# Patient Record
Sex: MALE | Race: WHITE | NOT HISPANIC OR LATINO | Employment: FULL TIME | URBAN - METROPOLITAN AREA
[De-identification: names, ages, dates, MRNs, and addresses within clinical notes are randomized per-mention and may not be internally consistent; named-entity substitution may affect disease eponyms.]

---

## 2017-05-10 ENCOUNTER — TELEPHONE (OUTPATIENT)
Dept: UROLOGY | Facility: CLINIC | Age: 32
End: 2017-05-10

## 2017-05-10 NOTE — TELEPHONE ENCOUNTER
Notified pt regarding semen specimen dropped of to clinic. 1 dead sperm seen in specimen per Nicky Gonzales NP. Pt instructed to have multiple ejaculations before dropping off 2 more samples and to continue alternate birth control methods until 2 samples cleared. Verbalized understanding

## 2017-08-07 ENCOUNTER — OFFICE VISIT (OUTPATIENT)
Dept: URGENT CARE | Facility: CLINIC | Age: 32
End: 2017-08-07
Payer: COMMERCIAL

## 2017-08-07 VITALS
SYSTOLIC BLOOD PRESSURE: 140 MMHG | TEMPERATURE: 98 F | HEIGHT: 69 IN | OXYGEN SATURATION: 99 % | WEIGHT: 205 LBS | BODY MASS INDEX: 30.36 KG/M2 | HEART RATE: 79 BPM | DIASTOLIC BLOOD PRESSURE: 87 MMHG | RESPIRATION RATE: 18 BRPM

## 2017-08-07 DIAGNOSIS — H92.03 OTALGIA OF BOTH EARS: Primary | ICD-10-CM

## 2017-08-07 PROCEDURE — 3008F BODY MASS INDEX DOCD: CPT | Mod: S$GLB,,, | Performed by: FAMILY MEDICINE

## 2017-08-07 PROCEDURE — 99214 OFFICE O/P EST MOD 30 MIN: CPT | Mod: S$GLB,,, | Performed by: FAMILY MEDICINE

## 2017-08-07 RX ORDER — METHYLPREDNISOLONE 4 MG/1
4 TABLET ORAL DAILY
Qty: 1 PACKAGE | Refills: 0 | Status: SHIPPED | OUTPATIENT
Start: 2017-08-07 | End: 2018-05-22

## 2017-08-07 RX ORDER — FLUTICASONE PROPIONATE 50 MCG
1 SPRAY, SUSPENSION (ML) NASAL DAILY
Qty: 1 BOTTLE | Refills: 2 | Status: SHIPPED | OUTPATIENT
Start: 2017-08-07 | End: 2018-05-22

## 2017-08-07 NOTE — PROGRESS NOTES
"Subjective:       Patient ID: Baltazar Hollins is a 31 y.o. male.    Vitals:  height is 5' 9" (1.753 m) and weight is 93 kg (205 lb). His temperature is 97.9 °F (36.6 °C). His blood pressure is 140/87 (abnormal) and his pulse is 79. His respiration is 18 and oxygen saturation is 99%.     Chief Complaint: Otalgia    Otalgia    There is pain in both ears. This is a new problem. The current episode started 1 to 4 weeks ago. The problem occurs constantly. The problem has been unchanged. There has been no fever. Associated symptoms include hearing loss. Pertinent negatives include no abdominal pain, coughing, headaches or sore throat.     Review of Systems   Constitution: Negative for chills, fever and malaise/fatigue.   HENT: Positive for congestion, ear pain and hearing loss. Negative for headaches, hoarse voice and sore throat.    Eyes: Negative for discharge and redness.   Cardiovascular: Negative for chest pain, dyspnea on exertion and leg swelling.   Respiratory: Negative for cough, shortness of breath, sputum production and wheezing.    Musculoskeletal: Negative for myalgias.   Gastrointestinal: Negative for abdominal pain and nausea.       Objective:      Physical Exam   Constitutional: He is oriented to person, place, and time. He appears well-developed and well-nourished.   HENT:   Head: Normocephalic and atraumatic.   Right Ear: External ear normal. Tympanic membrane is bulging. A middle ear effusion is present.   Left Ear: External ear normal. Tympanic membrane is bulging. A middle ear effusion is present.   Nose: Nose normal.   Mouth/Throat: Oropharynx is clear and moist.   Eyes: Conjunctivae, EOM and lids are normal. Pupils are equal, round, and reactive to light.   Neck: Trachea normal, full passive range of motion without pain and phonation normal. Neck supple.   Cardiovascular: Normal rate.    Pulmonary/Chest: Effort normal.   Musculoskeletal: Normal range of motion.   Neurological: He is alert and " oriented to person, place, and time.   Skin: Skin is warm, dry and intact.   Psychiatric: He has a normal mood and affect. His speech is normal and behavior is normal. Judgment and thought content normal. Cognition and memory are normal.   Nursing note and vitals reviewed.      Assessment:       1. Otalgia of both ears        Plan:         Otalgia of both ears    Other orders  -     methylPREDNISolone (MEDROL DOSEPACK) 4 mg tablet; Take 1 tablet (4 mg total) by mouth once daily. use as directed  Dispense: 1 Package; Refill: 0  -     fluticasone (FLONASE) 50 mcg/actuation nasal spray; 1 spray by Each Nare route once daily.  Dispense: 1 Bottle; Refill: 2

## 2017-09-05 DIAGNOSIS — K21.9 GASTROESOPHAGEAL REFLUX DISEASE, ESOPHAGITIS PRESENCE NOT SPECIFIED: Primary | ICD-10-CM

## 2017-09-05 DIAGNOSIS — K20.0 EOSINOPHILIC ESOPHAGITIS: ICD-10-CM

## 2017-09-05 RX ORDER — OMEPRAZOLE 40 MG/1
40 CAPSULE, DELAYED RELEASE ORAL DAILY
Qty: 30 CAPSULE | Refills: 6 | Status: SHIPPED | OUTPATIENT
Start: 2017-09-05 | End: 2018-09-13

## 2017-09-05 NOTE — TELEPHONE ENCOUNTER
Spoke to Bobby, let him know rx has been sent.  F/u scheduled for 9/22 with LASHANDA Hooper.  Pt verbalizes understanding.

## 2017-09-05 NOTE — TELEPHONE ENCOUNTER
Pt requesting refill for Prilosec sent to [facility]. Last written on 11/11/16 by Dr. Caceres with 6 refills.    Rx pended for provider approval.

## 2017-09-22 ENCOUNTER — OFFICE VISIT (OUTPATIENT)
Dept: GASTROENTEROLOGY | Facility: CLINIC | Age: 32
End: 2017-09-22
Payer: COMMERCIAL

## 2017-09-22 VITALS
DIASTOLIC BLOOD PRESSURE: 79 MMHG | SYSTOLIC BLOOD PRESSURE: 128 MMHG | HEART RATE: 64 BPM | BODY MASS INDEX: 32.24 KG/M2 | HEIGHT: 69 IN | WEIGHT: 217.63 LBS

## 2017-09-22 DIAGNOSIS — K20.0 EOSINOPHILIC ESOPHAGITIS: Primary | ICD-10-CM

## 2017-09-22 PROCEDURE — 99214 OFFICE O/P EST MOD 30 MIN: CPT | Mod: S$GLB,,, | Performed by: NURSE PRACTITIONER

## 2017-09-22 PROCEDURE — 99999 PR PBB SHADOW E&M-EST. PATIENT-LVL III: CPT | Mod: PBBFAC,,, | Performed by: NURSE PRACTITIONER

## 2017-09-22 PROCEDURE — 3008F BODY MASS INDEX DOCD: CPT | Mod: S$GLB,,, | Performed by: NURSE PRACTITIONER

## 2017-09-22 NOTE — PROGRESS NOTES
[facility] Note    Reason for Visit:  Gastroesophageal reflux    PCP: Primary Doctor No     Referring MD:     HPI:  This is a 32 y.o. male here for f/u evaluation of EOE and GERD.  Last GI visit in 10/2016. Subsequent EGD w/ EOE.  Currently reports dysphagia and pyrosis well controlled with once daily PPI.  Pyrosis now occurring 1-2 twice every two weeks and dysphagia may have occurred once since last visit.   S/s are also better if eating smaller portions diana at night.  Briefly ran out of PPI and took Tums with relief.  Onset-10 years ago dx @[facility]. Took Nexium with good relief at that time, but has been off of it for a few years outside of occasional  PRN use. Not necessarily getting worse, but has been persisting over the years.    Abdominal pain - denies  Bowel habits - normal, 1-2 formed BM/day  GI bleeding - denies hematochezia, hematemesis, melena, BRBPR, black/tarry stools, and coffee ground emesis    ROS:  Constitutional: No fevers, no chills, No unintentional weight loss, no fatigue,   ENT: No allergies  CV: No chest pain, no palpitations, no perif. edema, no sob on exertion  Pulm: No cough, No shortness of breath, no wheezes, no sputum  Ophtho: No vision changes  GI: see HPI; also no nausea, no vomiting, no change in appetite  Derm: No rash  Heme: No lymphadenopathy, No bruising  MSK: +arthritis in hands, no muscle pain, no muscle weakness  : No dysuria, No hematuria  Endo: No hot or cold intolerance  Neuro: No syncope, No seizure,     Medical History:  has a past medical history of GERD (gastroesophageal reflux disease).    Surgical History:  has a past surgical history that includes Hand surgery and [place] tooth extraction.    Family History: family history includes Diabetes in his paternal grandmother..     Social History:  reports that he has never smoked. He does not have any smokeless tobacco history on file. He reports that he drinks about 3.6 oz of alcohol per  "week . He reports that he does not use drugs.    No Known Allergies    Current Outpatient Prescriptions   Medication Sig    multivitamin capsule Take 1 capsule by mouth once daily.    omeprazole (PRILOSEC) 40 MG capsule Take 1 capsule (40 mg total) by mouth once daily.    fluticasone (FLONASE) 50 mcg/actuation nasal spray 1 spray by Each Nare route once daily.    methylPREDNISolone (MEDROL DOSEPACK) 4 mg tablet Take 1 tablet (4 mg total) by mouth once daily. use as directed     No current facility-administered medications for this visit.        Objective Findings:    Vital Signs:  /79   Pulse 64   Ht 5' 9" (1.753 m)   Wt 98.7 kg (217 lb 9.5 oz)   BMI 32.13 kg/m²   Body mass index is 32.13 kg/m².    Physical Exam:  General Appearance: Well appearing in no acute distress  Head:   Normocephalic, without obvious abnormality  Eyes:    No scleral icterus, EOMI  ENT: Neck supple, Lips, mucosa, and tongue normal; teeth and gums normal  Lungs: CTA bilaterally in anterior and posterior fields, no wheezes, no crackles.  Heart:  Regular rate and rhythm, S1, S2 normal, no murmurs heard  Abdomen: Soft, non tender, non distended with positive bowel sounds in all four quadrants. No hepatosplenomegaly, ascites, or mass  Extremities: 2+ radial pulses, no clubbing, cyanosis or edema  Skin: No rash to exposed areas  Neurologic: A&Ox4      Labs:  No results found for: WBC, HGB, HCT, PLT, CHOL, TRIG, HDL, LDLDIRECT, ALT, AST, NA, K, CL, CREATININE, BUN, CO2, TSH, PSA, INR, GLUF, HGBA1C, MICROALBUR      Endoscopy:    11/2016 EGD Dr. Madonna ZAMORA grade A esophagitis. Moderate Schatzki ring. Dilated up to 18 mm. small HH. bx-EOE  Per pt, EGD 8-10 years ago @ [facility]- esophagitis and dx w/ GERD  Colonoscopy- none  Assessment:  1. Eosinophilic esophagitis           Recommendations:  1.EOE- responsive to daily PPI. S/s well controlled. 6 food elimination diet discussed and handout provided.  Long term PPI use discussed. Pt made " aware of slightly increased risk of CAP, C diff infections and decreased mag, vit D and Vit B- 12 levels with long term PPI use. Yearly Mag, B 12 and Vit D levels recomended. Routine bone mineral densities per PCP recommended. Pt instructed to contact PCP for s/s of lung infection (fever, chills, CP, SOB, and/or productive cough) or GI infection (profuse, watery diarrhea with or without rectal bleeding, severe abd pain,and/or fever). Understanding verbalized pt may trial wean to lowest dose necessary to control s/s (handout provided).       F/u in 6 mos-1 year.      Order summary:         Thank you so much for allowing me to participate in the care of Baltazar Hollins    Brooke Dueñas, APRN,FNP-C

## 2017-09-22 NOTE — PATIENT INSTRUCTIONS
Six food elimination diet    Association with eosinophilic esophagitis:    1. Wheat  2. Milk  3. Soy  4. Eggs  5. Nuts  6. Seafood     Eliminate all of the above foods for 6 weeks and then reintroduce 1 food product back every 2 weeks. If one worsens swallowing or reflux, it needs to be completely eliminated.    PPI use:  If you abruptly stop taking a PPI (if you have been using it for 12 weeks or more), you will likely experience rebound acid reflux as your stomach acid pumps reactivate. Therefore, it is important to wean off of your PPI slowly and let your body readjust. Wean off in this manner:    1. Take your PPI every other day for 2 weeks. Alternately try half dose daily for a week, then every other day for a week.  2. Then take your PPI every 3rd day for 1 week.  3. Then take your PPI just once a week.  4. Try to stop altogether, and use as/if needed    If symptoms return, go back to the lowest dose necessary to control your symptoms (Good control means symptoms of reflux occurring less than twice per week).

## 2017-12-01 ENCOUNTER — OFFICE VISIT (OUTPATIENT)
Dept: URGENT CARE | Facility: CLINIC | Age: 32
End: 2017-12-01
Payer: COMMERCIAL

## 2017-12-01 VITALS
TEMPERATURE: 98 F | DIASTOLIC BLOOD PRESSURE: 80 MMHG | HEIGHT: 69 IN | WEIGHT: 210 LBS | SYSTOLIC BLOOD PRESSURE: 128 MMHG | BODY MASS INDEX: 31.1 KG/M2 | HEART RATE: 82 BPM | OXYGEN SATURATION: 97 %

## 2017-12-01 DIAGNOSIS — S61.217A LACERATION OF LEFT LITTLE FINGER WITHOUT FOREIGN BODY WITHOUT DAMAGE TO NAIL, INITIAL ENCOUNTER: Primary | ICD-10-CM

## 2017-12-01 PROCEDURE — 12001 RPR S/N/AX/GEN/TRNK 2.5CM/<: CPT | Mod: S$GLB,,, | Performed by: FAMILY MEDICINE

## 2017-12-01 PROCEDURE — 99214 OFFICE O/P EST MOD 30 MIN: CPT | Mod: 25,S$GLB,, | Performed by: FAMILY MEDICINE

## 2017-12-01 RX ORDER — CEPHALEXIN 500 MG/1
500 CAPSULE ORAL EVERY 12 HOURS
Qty: 20 CAPSULE | Refills: 0 | Status: SHIPPED | OUTPATIENT
Start: 2017-12-01 | End: 2017-12-11

## 2017-12-01 NOTE — PROGRESS NOTES
"Subjective:       Patient ID: Baltazar Hollins is a 32 y.o. male.    Vitals:  height is 5' 9" (1.753 m) and weight is 95.3 kg (210 lb). His oral temperature is 98.1 °F (36.7 °C). His blood pressure is 128/80 and his pulse is 82. His oxygen saturation is 97%.     Chief Complaint: Laceration    Patient has laceration on the left 5th digit from a chain saw about 3 hours ago.       Laceration    The incident occurred 3 to 6 hours ago. The laceration is located on the left hand. The laceration mechanism was a metal edge. The pain is at a severity of 0/10. The patient is experiencing no pain. He reports no foreign bodies present. His tetanus status is UTD.     Review of Systems   Constitution: Negative for weakness and malaise/fatigue.   HENT: Negative for nosebleeds.    Cardiovascular: Negative for chest pain and syncope.   Respiratory: Negative for shortness of breath.    Musculoskeletal: Negative for back pain, joint pain, joint swelling and neck pain.   Gastrointestinal: Negative for abdominal pain.   Genitourinary: Negative for hematuria.   Neurological: Negative for dizziness and numbness.       Objective:      Physical Exam   Constitutional: He is oriented to person, place, and time. He appears well-developed and well-nourished.   HENT:   Head: Normocephalic and atraumatic.   Eyes: EOM are normal. Pupils are equal, round, and reactive to light.   Neck: Normal range of motion. Neck supple.   Musculoskeletal: Normal range of motion.   Neurological: He is alert and oriented to person, place, and time.   Skin: Laceration noted.            Assessment:       1. Laceration of left little finger without foreign body without damage to nail, initial encounter        Plan:         Laceration of left little finger without foreign body without damage to nail, initial encounter  -     LACERATION REPAIR    Other orders  -     cephALEXin (KEFLEX) 500 MG capsule; Take 1 capsule (500 mg total) by mouth every 12 (twelve) hours.  " Dispense: 20 capsule; Refill: 0

## 2017-12-01 NOTE — PROCEDURES
Laceration Repair  Date/Time: 12/1/2017 5:08 PM  Performed by: MOSES NETTLES  Authorized by: MOSES NETTLES   Body area: upper extremity  Location details: left small finger  Foreign bodies: no foreign bodies  Tendon involvement: none  Nerve involvement: none  Vascular damage: no  Comments: 1 steristrip placed with a splint

## 2017-12-04 ENCOUNTER — TELEPHONE (OUTPATIENT)
Dept: URGENT CARE | Facility: CLINIC | Age: 32
End: 2017-12-04

## 2018-05-22 ENCOUNTER — TELEPHONE (OUTPATIENT)
Dept: ENDOSCOPY | Facility: HOSPITAL | Age: 33
End: 2018-05-22

## 2018-05-22 DIAGNOSIS — R13.19 ESOPHAGEAL DYSPHAGIA: Primary | ICD-10-CM

## 2018-05-22 NOTE — TELEPHONE ENCOUNTER
Patient's wife inquiring about repeat EGD for  because he is having trouble swallowing.  Please advise.  Thank you.  Cynthia

## 2018-07-20 ENCOUNTER — ANESTHESIA EVENT (OUTPATIENT)
Dept: ENDOSCOPY | Facility: HOSPITAL | Age: 33
End: 2018-07-20
Payer: COMMERCIAL

## 2018-07-20 ENCOUNTER — HOSPITAL ENCOUNTER (OUTPATIENT)
Facility: HOSPITAL | Age: 33
Discharge: HOME | End: 2018-07-20
Attending: INTERNAL MEDICINE | Admitting: INTERNAL MEDICINE
Payer: COMMERCIAL

## 2018-07-20 ENCOUNTER — ANESTHESIA (OUTPATIENT)
Dept: ENDOSCOPY | Facility: HOSPITAL | Age: 33
End: 2018-07-20
Payer: COMMERCIAL

## 2018-07-20 ENCOUNTER — SURGERY (OUTPATIENT)
Age: 33
End: 2018-07-20

## 2018-07-20 VITALS
RESPIRATION RATE: 18 BRPM | BODY MASS INDEX: 31.84 KG/M2 | SYSTOLIC BLOOD PRESSURE: 129 MMHG | DIASTOLIC BLOOD PRESSURE: 69 MMHG | OXYGEN SATURATION: 100 % | TEMPERATURE: 99 F | WEIGHT: 215 LBS | HEIGHT: 69 IN | HEART RATE: 64 BPM

## 2018-07-20 DIAGNOSIS — R13.14 DYSPHAGIA, PHARYNGOESOPHAGEAL PHASE: Primary | ICD-10-CM

## 2018-07-20 DIAGNOSIS — R13.19 ESOPHAGEAL DYSPHAGIA: ICD-10-CM

## 2018-07-20 PROCEDURE — 25000003 PHARM REV CODE 250: Performed by: INTERNAL MEDICINE

## 2018-07-20 PROCEDURE — 63600175 PHARM REV CODE 636 W HCPCS: Performed by: NURSE ANESTHETIST, CERTIFIED REGISTERED

## 2018-07-20 PROCEDURE — C1726 CATH, BAL DIL, NON-VASCULAR: HCPCS | Performed by: INTERNAL MEDICINE

## 2018-07-20 PROCEDURE — 43249 ESOPH EGD DILATION <30 MM: CPT | Mod: ,,, | Performed by: INTERNAL MEDICINE

## 2018-07-20 PROCEDURE — E9220 PRA ENDO ANESTHESIA: HCPCS | Mod: ,,, | Performed by: NURSE ANESTHETIST, CERTIFIED REGISTERED

## 2018-07-20 PROCEDURE — 43239 EGD BIOPSY SINGLE/MULTIPLE: CPT

## 2018-07-20 PROCEDURE — 88305 TISSUE EXAM BY PATHOLOGIST: CPT | Mod: 26,,, | Performed by: PATHOLOGY

## 2018-07-20 PROCEDURE — 37000009 HC ANESTHESIA EA ADD 15 MINS: Performed by: INTERNAL MEDICINE

## 2018-07-20 PROCEDURE — 37000008 HC ANESTHESIA 1ST 15 MINUTES: Performed by: INTERNAL MEDICINE

## 2018-07-20 PROCEDURE — 43249 ESOPH EGD DILATION <30 MM: CPT | Performed by: INTERNAL MEDICINE

## 2018-07-20 PROCEDURE — 88305 TISSUE EXAM BY PATHOLOGIST: CPT | Performed by: PATHOLOGY

## 2018-07-20 RX ORDER — SODIUM CHLORIDE 9 MG/ML
INJECTION, SOLUTION INTRAVENOUS CONTINUOUS
Status: DISCONTINUED | OUTPATIENT
Start: 2018-07-20 | End: 2018-07-20 | Stop reason: HOSPADM

## 2018-07-20 RX ORDER — SODIUM CHLORIDE 0.9 % (FLUSH) 0.9 %
3 SYRINGE (ML) INJECTION
Status: DISCONTINUED | OUTPATIENT
Start: 2018-07-20 | End: 2018-07-20 | Stop reason: HOSPADM

## 2018-07-20 RX ORDER — PROPOFOL 10 MG/ML
VIAL (ML) INTRAVENOUS CONTINUOUS PRN
Status: DISCONTINUED | OUTPATIENT
Start: 2018-07-20 | End: 2018-07-20

## 2018-07-20 RX ORDER — LIDOCAINE HCL/PF 100 MG/5ML
SYRINGE (ML) INTRAVENOUS
Status: DISCONTINUED | OUTPATIENT
Start: 2018-07-20 | End: 2018-07-20

## 2018-07-20 RX ORDER — PROPOFOL 10 MG/ML
VIAL (ML) INTRAVENOUS
Status: DISCONTINUED | OUTPATIENT
Start: 2018-07-20 | End: 2018-07-20

## 2018-07-20 RX ADMIN — LIDOCAINE HYDROCHLORIDE 50 MG: 20 INJECTION, SOLUTION INTRAVENOUS at 12:07

## 2018-07-20 RX ADMIN — SODIUM CHLORIDE: 0.9 INJECTION, SOLUTION INTRAVENOUS at 11:07

## 2018-07-20 RX ADMIN — PROPOFOL 200 MCG/KG/MIN: 10 INJECTION, EMULSION INTRAVENOUS at 12:07

## 2018-07-20 RX ADMIN — PROPOFOL 100 MG: 10 INJECTION, EMULSION INTRAVENOUS at 12:07

## 2018-07-20 NOTE — DISCHARGE INSTRUCTIONS
Courtesy of [facility] website: [email or web address]      Eosinophilic Esophagitis  What is eosinophilic esophagitis?  Eosinophilic esophagitis (EoE) is an inflammation of the esophagus (the tube connecting the mouth to the stomach) caused by a specific white blood cell - the eosinophil. Nearly three quarters of affected cases occur in white males; and the number of people affected is about 1 in 10,000 (but health officials think this number is underreported). This is a relatively newly recognized disease that has been increasingly diagnosed in adults and children over the past decade.    What are the symptoms of eosinophilic esophagitis?  Patients with eosinophilic esophagitis may experience symptoms such as heartburn, regurgitation, chest pain and difficulty swallowing. Adolescents and adults with eosinophilic esophagitis frequently complain of intermittent swallowing problems; infants and young children may develop feeding disorders leading to poor weight gain. In a small number of cases, eosinophilic esophagitis leads to the development of an extremely narrowed esophagus and occasionally food may get stuck in the esophagus and require emergency removal.    How is eosinophilic esophagitis diagnosed?  Currently, eosinophilic esophagitis is diagnosed by upper endoscopy and biopsy. The endoscopy sometimes reveals rings, white plaques (patches), or grooves in the esophagus. However, eosinophilic esophagitis may be present even if the esophagus looks normal. That is why biopsy samples are taken. Biopsy samples look for an overabundance of eosinophils in the esophageal tissue. Sometimes multiple biopsies may need to be taken.    How is eosinophilic esophagitis treated?  There are two main treatment approaches to eosinophilic esophagitis: steroid medications and dietary management.    Drug approaches. Steroids are the most commonly used medication  "for both the control of the inflammation and the direct suppression of the eosinophils. These medications can be taken orally (in pill form) or topically. Steroids may need to be taken long term, though their long-term use for eosinophilic esophagitis has not been well studied. What is known is that for some patients, continued swallowed use of steroids can result in Candida infections (yeast infections of the mouth and esophagus) as a side effect.    A drug class that is currently being investigated for future use is biologic agents. These drugs would specifically target the white blood cell itself, the eosinophil.    Dietary management. There is also some thought that food allergies may be a cause of eosinophilic esophagitis. However, which foods might be the cause has yet to be determined. The more common foods associated with foods allergies in general are milk, eggs, nuts, beef, wheat, fish, shellfish, corn and soy. In the case of eosinophilic esophagitis, a single food may be problematic in some people and many foods may be the cause in others.    With this in mind, several dietary approaches can be tried. Under a "targeted" approach, foods are eliminated from the diet one at a time as best indicated by allergy testing. Unfortunately though, typical allergy tests - such as skin prick tests or blood tests - are not usually effective in determining the problematic foods responsible for eosinophilic esophagitis. Therefore another type of elimination diet - the "common foods" elimination diet (as outlined in the paragraph above) is often tried.    A third approach, "an elemental diet," consists of removing all sources of protein from the diet. This is a very strict, tasteless, amino acid formula-based diet that may require a feeding tube hooked up directly to the stomach in order to obtain enough nourishment. In this diet, only amino acids (the building blocks of proteins) are supplied to patients. This is, " however, the most effective diet for people with eosinophilic esophagitis.    With any of these food trial diets, foods are slowly reintroduced in an attempt to discover which ones are causing the allergic reaction. Repeat biopsy and endoscopic examination are necessary to determine which foods are not problematic.    Other non-drug approaches. Another treatment that has been tried for some patients is esophageal dilatation. This is tried specifically in patients who get food stuck in their esophagus.    What is the outlook for patients with eosinophilic esophagitis?  At least based on what is known to date, eosinophilic esophagitis does not cause cancer of the esophagus and is not thought to limit life expectancy in any way.

## 2018-07-20 NOTE — ANESTHESIA PREPROCEDURE EVALUATION
07/20/2018  Baltazar Hollins is a 32 y.o., male     Patient Active Problem List   Diagnosis    Esophageal dysphagia         Anesthesia Evaluation    I have reviewed the Patient Summary Reports.    I have reviewed the Nursing Notes.   I have reviewed the Medications.     Review of Systems  Anesthesia Hx:  No problems with previous Anesthesia    Cardiovascular:  Cardiovascular Normal     Pulmonary:  Pulmonary Normal    Hepatic/GI:   GERD    Neurological:  Neurology Normal    Endocrine:  Endocrine Normal        Physical Exam  General:  Well nourished    Airway/Jaw/Neck:  Airway Findings: Mouth Opening: Normal Tongue: Normal  General Airway Assessment: Adult  Mallampati: II  TM Distance: Normal, at least 6 cm       Chest/Lungs:  Chest/Lungs Findings: Clear to auscultation, Normal Respiratory Rate     Heart/Vascular:  Heart Findings: Rate: Normal  Rhythm: Regular Rhythm             Anesthesia Plan  Type of Anesthesia, risks & benefits discussed:  Anesthesia Type:  general, MAC  Patient's Preference:   Intra-op Monitoring Plan:   Intra-op Monitoring Plan Comments:   Post Op Pain Control Plan:   Post Op Pain Control Plan Comments:   Induction:   IV  Beta Blocker:  Patient is not currently on a Beta-Blocker (No further documentation required).       Informed Consent: Patient understands risks and agrees with Anesthesia plan.  Questions answered. Anesthesia consent signed with patient.  ASA Score: 2     Day of Surgery Review of History & Physical:            Ready For Surgery From Anesthesia Perspective.

## 2018-07-20 NOTE — [employer] PATIENT INSTRUCTIONS
Discharge Summary/Instructions after an Endoscopic Procedure  Patient Name: Baltazar Hollins  Patient MRN: [id number]  Patient YOB: 1985 Friday, July 20, 2018  Lester Yoder MD  RESTRICTIONS:  During your procedure today, you received medications for sedation.  These   medications may affect your judgment, balance and coordination.  Therefore,   for 24 hours, you have the following restrictions:   - DO NOT drive a car, operate machinery, make legal/financial decisions,   sign important papers or drink alcohol.    ACTIVITY:  Today: no heavy lifting, straining or running due to procedural   sedation/anesthesia.  The following day: return to full activity including work.  DIET:  Eat and drink normally unless instructed otherwise.     TREATMENT FOR COMMON SIDE EFFECTS:  - Mild abdominal pain, nausea, belching, bloating or excessive gas:  rest,   eat lightly and use a heating pad.  - Sore Throat: treat with throat lozenges and/or gargle with warm salt   water.  - Because air was used during the procedure, expelling large amounts of air   from your rectum or belching is normal.  - If a bowel prep was taken, you may not have a bowel movement for 1-3 days.    This is normal.  SYMPTOMS TO WATCH FOR AND REPORT TO YOUR PHYSICIAN:  1. Abdominal pain or bloating, other than gas cramps.  2. Chest pain.  3. Back pain.  4. Signs of infection such as: chills or fever occurring within 24 hours   after the procedure.  5. Rectal bleeding, which would show as bright red, maroon, or black stools.   (A tablespoon of blood from the rectum is not serious, especially if   hemorrhoids are present.)  6. Vomiting.  7. Weakness or dizziness.  GO DIRECTLY TO THE NEAREST EMERGENCY ROOM IF YOU HAVE ANY OF THE FOLLOWING:      Difficulty breathing              Chills and/or fever over 101 F   Persistent vomiting and/or vomiting blood   Severe abdominal pain   Severe chest pain   Black, tarry stools   Bleeding- more than one  tablespoon   Any other symptom or condition that you feel may need urgent attention  Your doctor recommends these additional instructions:  If any biopsies were taken, your doctors clinic will contact you in 1 to 2   weeks with any results.  - Patient has a contact number available for emergencies.  The signs and   symptoms of potential delayed complications were discussed with the   patient.  Return to normal activities tomorrow.  Written discharge   instructions were provided to the patient.   - Discharge patient to home.   - Await pathology results.   - The findings and recommendations were discussed with the designated   responsible adult.   - Return to GI clinic at appointment to be scheduled.  For questions, problems or results please call your physician - Lester Yoder MD at Work:  [phone].  [facility], EMERGENCY ROOM PHONE NUMBER: [phone]  IF A COMPLICATION OR EMERGENCY SITUATION ARISES AND YOU ARE UNABLE TO REACH   YOUR PHYSICIAN - GO DIRECTLY TO THE EMERGENCY ROOM.  Lester Yoder MD  7/20/2018 12:21:48 PM  This report has been verified and signed electronically.  [employer]

## 2018-07-20 NOTE — H&P
Short Stay Endoscopy History and Physical    PCP - Primary Doctor No     Procedure - EGD  ASA - per anesthesia  Mallampati - per anesthesia  History of Anesthesia problems - no  Family history Anesthesia problems -  no   Plan of anesthesia - General    HPI:  This is a 32 y.o. male here for evaluation of :       Dysphagia, gerd    ROS:  Constitutional: No fevers, chills, No weight loss  CV: No chest pain  Pulm: No cough, No shortness of breath  Ophtho: No vision changes  GI: see HPI  Derm: No rash    Medical History:  has a past medical history of GERD (gastroesophageal reflux disease).    Surgical History:  has a past surgical history that includes Hand surgery; [place] tooth extraction; and Vasectomy.    Family History: family history includes Diabetes in his paternal grandmother.. Otherwise no colon cancer, inflammatory bowel disease, or GI malignancies.    Social History:  reports that he has never smoked. He has never used smokeless tobacco. He reports that he drinks about 3.6 oz of alcohol per week . He reports that he does not use drugs.    Review of patient's allergies indicates:  No Known Allergies    Medications:   Prescriptions Prior to Admission   Medication Sig Dispense Refill Last Dose    omeprazole (PRILOSEC) 40 MG capsule Take 1 capsule (40 mg total) by mouth once daily. 30 capsule 6 Past Week at Unknown time    multivitamin capsule Take 1 capsule by mouth once daily.   More than a month at Unknown time       Physical Exam:    Vital Signs: see nursing notes    General Appearance: Well appearing in no acute distress  Eyes:    No scleral icterus  ENT: Neck supple, Lips, mucosa, and tongue normal; teeth and gums normal  Lungs: CTA anteriorly  Heart:  Regular rate, S1, S2 normal, no murmurs heard.  Abdomen: Soft, non tender, non distended with normal bowel sounds. No hepatosplenomegaly, ascites, or mass.  Extremities: No edema  Skin: No rash    Labs:  No results found for: WBC, HGB, HCT, PLT, CHOL,  TRIG, HDL, LDLDIRECT, ALT, AST, NA, K, CL, CREATININE, BUN, CO2, TSH, PSA, INR, GLUF, HGBA1C, MICROALBUR    I have explained the risks and benefits of endoscopy procedures to the patient including but not limited to bleeding, perforation, infection, and death.      Lester Yoder MD

## 2018-07-20 NOTE — ANESTHESIA POSTPROCEDURE EVALUATION
"Anesthesia Post Evaluation    Patient: Baltazar Hollins    Procedure(s) Performed: Procedure(s) (LRB):  ESOPHAGOGASTRODUODENOSCOPY (EGD) (N/A)    Final Anesthesia Type: general  Patient location during evaluation: PACU  Patient participation: Yes- Able to Participate  Level of consciousness: awake and alert  Post-procedure vital signs: reviewed and stable  Pain management: adequate  Airway patency: patent  PONV status at discharge: No PONV  Anesthetic complications: no      Cardiovascular status: blood pressure returned to baseline  Respiratory status: unassisted  Hydration status: euvolemic  Follow-up not needed.        Visit Vitals  /69 (BP Location: Left arm, Patient Position: Lying)   Pulse 64   Temp 37 °C (98.6 °F) (Temporal)   Resp 18   Ht 5' 9" (1.753 m)   Wt 97.5 kg (215 lb)   SpO2 100%   BMI 31.75 kg/m²       Pain/Leela Score: Pain Assessment Performed: Yes (7/20/2018 12:49 PM)  Presence of Pain: denies (7/20/2018 12:49 PM)  Leela Score: 10 (7/20/2018 12:29 PM)      "

## 2018-07-20 NOTE — TRANSFER OF CARE
"Anesthesia Transfer of Care Note    Patient: Baltazar Hollins    Procedure(s) Performed: Procedure(s) (LRB):  ESOPHAGOGASTRODUODENOSCOPY (EGD) (N/A)    Patient location: GI    Anesthesia Type: general    Transport from OR: Transported from OR on room air with adequate spontaneous ventilation    Post pain: adequate analgesia    Post assessment: no apparent anesthetic complications    Post vital signs: stable    Level of consciousness: sedated    Nausea/Vomiting: no nausea/vomiting    Complications: none    Transfer of care protocol was followed      Last vitals:   Visit Vitals  /86   Pulse 69   Temp 36.8 °C (98.2 °F) (Temporal)   Resp 14   Ht 5' 9" (1.753 m)   Wt 97.5 kg (215 lb)   SpO2 99%   BMI 31.75 kg/m²     "

## 2018-07-27 ENCOUNTER — TELEPHONE (OUTPATIENT)
Dept: ENDOSCOPY | Facility: HOSPITAL | Age: 33
End: 2018-07-27

## 2018-07-28 ENCOUNTER — TELEPHONE (OUTPATIENT)
Dept: GASTROENTEROLOGY | Facility: CLINIC | Age: 33
End: 2018-07-28

## 2018-07-28 DIAGNOSIS — K20.0 EOSINOPHILIC ESOPHAGITIS: Primary | ICD-10-CM

## 2018-08-22 ENCOUNTER — OFFICE VISIT (OUTPATIENT)
Dept: ALLERGY | Facility: CLINIC | Age: 33
End: 2018-08-22
Payer: COMMERCIAL

## 2018-08-22 VITALS — HEIGHT: 69 IN | BODY MASS INDEX: 33.07 KG/M2 | OXYGEN SATURATION: 98 % | WEIGHT: 223.31 LBS | HEART RATE: 80 BPM

## 2018-08-22 DIAGNOSIS — K20.0 EOSINOPHILIC ESOPHAGITIS: Primary | ICD-10-CM

## 2018-08-22 PROCEDURE — 99999 PR PBB SHADOW E&M-EST. PATIENT-LVL III: CPT | Mod: PBBFAC,,, | Performed by: ALLERGY & IMMUNOLOGY

## 2018-08-22 PROCEDURE — 95004 PERQ TESTS W/ALRGNC XTRCS: CPT | Mod: S$GLB,,, | Performed by: ALLERGY & IMMUNOLOGY

## 2018-08-22 PROCEDURE — 99244 OFF/OP CNSLTJ NEW/EST MOD 40: CPT | Mod: 25,S$GLB,, | Performed by: ALLERGY & IMMUNOLOGY

## 2018-08-22 NOTE — LETTER
August 22, 2018      Lester Yoder MD  [address]  [facility] [address]           [facility]  [address] [facility] [address] [place] [address]  Phone: [phone]          Patient: Baltazar Hollins   MR Number: [id number]   YOB: 1985   Date of Visit: 8/22/2018       Dear Dr. Lester Yoder:    Thank you for referring Baltazar Hollins to me for evaluation. Attached you will find relevant portions of my assessment and plan of care.    If you have questions, please do not hesitate to call me. I look forward to following Baltazar Hollins along with you.    Sincerely,    Elisa Lacey MD    Enclosure  CC:  No Recipients    If you would like to receive this communication electronically, please contact [email or web address] or [phone] to request more information on [employer] access.    For providers and/or their staff who would like to refer a patient to [facility], please contact us through our one-stop-shop provider referral line, [facility] , at [phone].    If you feel you have received this communication in error or would no longer like to receive these types of communications, please e-mail [email or web address]

## 2018-08-22 NOTE — PROGRESS NOTES
Subjective:       Patient ID: Baltazar Hollins is a 32 y.o. male.    Chief Complaint:  Allergies (Food allergies)      33 yo man presents for consult from Dr Lester Yoder for EoE. He states for years he has issues with reflux. Feels heartburn, wakes with cough and often regurgitate acid. He would have periodic times where difficult to swallow. Recent had EGD with stretching and biopsies and had 7 eos/HPF distal esophagus and 23 eos/HPF mid esophagus. He is on PPI, omeprazole daily ad advised to come for food allergy test. He denies chronic rhinitis asthma or eczema. He has no known food, insect or latex allergy. He can not identify and triggers fr dysphagia. He does have H/o sinus issues and had deviated septum repair and balloon sinuplasty in Dec 2017 and has helped. He takes no antihistamine medications just PPI.          Environmental History: see history section for home environment  Review of Systems   Constitutional: Negative for activity change, appetite change, chills, fatigue, fever and unexpected weight change.   HENT: Positive for trouble swallowing. Negative for congestion, ear discharge, ear pain, facial swelling, hearing loss, mouth sores, nosebleeds, postnasal drip, rhinorrhea, sinus pressure, sneezing, sore throat, tinnitus and voice change.    Eyes: Negative for discharge, redness, itching and visual disturbance.   Respiratory: Positive for cough. Negative for chest tightness, shortness of breath and wheezing.    Cardiovascular: Negative for chest pain, palpitations and leg swelling.   Gastrointestinal: Negative for abdominal distention, abdominal pain, constipation, diarrhea, nausea and vomiting.   Genitourinary: Negative for difficulty urinating.   Musculoskeletal: Negative for arthralgias, back pain, joint swelling and myalgias.   Skin: Negative for color change, pallor and rash.   Neurological: Negative for dizziness, tremors, speech difficulty, weakness, light-headedness and headaches.    Hematological: Negative for adenopathy. Does not bruise/bleed easily.   Psychiatric/Behavioral: Negative for agitation, confusion, decreased concentration and sleep disturbance. The patient is not nervous/anxious.         Objective:      Physical Exam   Constitutional: He is oriented to person, place, and time. He appears well-developed and well-nourished. No distress.   HENT:   Head: Normocephalic and atraumatic.   Right Ear: Hearing, tympanic membrane, external ear and ear canal normal.   Left Ear: Hearing, tympanic membrane, external ear and ear canal normal.   Nose: No mucosal edema (pink turbinates), rhinorrhea, sinus tenderness or septal deviation. No epistaxis.   Mouth/Throat: Oropharynx is clear and moist and mucous membranes are normal. No uvula swelling.   Eyes: Conjunctivae are normal. Right eye exhibits no discharge. Left eye exhibits no discharge.   Neck: Normal range of motion. No thyromegaly present.   Cardiovascular: Normal rate, regular rhythm and normal heart sounds.   No murmur heard.  Pulmonary/Chest: Effort normal and breath sounds normal. No respiratory distress. He has no wheezes.   Abdominal: Soft. He exhibits no distension. There is no tenderness.   Musculoskeletal: Normal range of motion. He exhibits no edema.   Lymphadenopathy:     He has no cervical adenopathy.   Neurological: He is alert and oriented to person, place, and time. Coordination normal.   Skin: Skin is warm and dry. No rash noted. No erythema.   Psychiatric: He has a normal mood and affect. His behavior is normal. Judgment and thought content normal.   Nursing note and vitals reviewed.      Laboratory:   Percutaneous Skin Testing: prick skin test all foods #60, 8/22/18:   Assessment:       1. Eosinophilic esophagitis         Plan:       1. No evidence of any IgE mediated food allergy on skin test, advised EoE is hard to find trigger but most of time related to milk and wheat. Advised eliminating milk from diet first to see  if symptoms improve  2. Dr Yoder notified of completed consult via Epic

## 2018-09-13 ENCOUNTER — LAB VISIT (OUTPATIENT)
Dept: LAB | Facility: HOSPITAL | Age: 33
End: 2018-09-13
Payer: COMMERCIAL

## 2018-09-13 ENCOUNTER — OFFICE VISIT (OUTPATIENT)
Dept: GASTROENTEROLOGY | Facility: CLINIC | Age: 33
End: 2018-09-13
Payer: COMMERCIAL

## 2018-09-13 VITALS
SYSTOLIC BLOOD PRESSURE: 142 MMHG | HEART RATE: 54 BPM | BODY MASS INDEX: 33.37 KG/M2 | HEIGHT: 69 IN | DIASTOLIC BLOOD PRESSURE: 98 MMHG | WEIGHT: 225.31 LBS

## 2018-09-13 DIAGNOSIS — K21.00 GASTROESOPHAGEAL REFLUX DISEASE WITH ESOPHAGITIS: ICD-10-CM

## 2018-09-13 DIAGNOSIS — Z51.81 ENCOUNTER FOR MONITORING LONG-TERM PROTON PUMP INHIBITOR THERAPY: ICD-10-CM

## 2018-09-13 DIAGNOSIS — K20.0 EOSINOPHILIC ESOPHAGITIS: Primary | ICD-10-CM

## 2018-09-13 DIAGNOSIS — Z79.899 ENCOUNTER FOR MONITORING LONG-TERM PROTON PUMP INHIBITOR THERAPY: ICD-10-CM

## 2018-09-13 LAB
25(OH)D3+25(OH)D2 SERPL-MCNC: 26 NG/ML
MAGNESIUM SERPL-MCNC: 2.4 MG/DL
VIT B12 SERPL-MCNC: 486 PG/ML

## 2018-09-13 PROCEDURE — 36415 COLL VENOUS BLD VENIPUNCTURE: CPT

## 2018-09-13 PROCEDURE — 82306 VITAMIN D 25 HYDROXY: CPT

## 2018-09-13 PROCEDURE — 82607 VITAMIN B-12: CPT

## 2018-09-13 PROCEDURE — 99999 PR PBB SHADOW E&M-EST. PATIENT-LVL III: CPT | Mod: PBBFAC,,, | Performed by: NURSE PRACTITIONER

## 2018-09-13 PROCEDURE — 99214 OFFICE O/P EST MOD 30 MIN: CPT | Mod: S$GLB,,, | Performed by: NURSE PRACTITIONER

## 2018-09-13 PROCEDURE — 3008F BODY MASS INDEX DOCD: CPT | Mod: CPTII,S$GLB,, | Performed by: NURSE PRACTITIONER

## 2018-09-13 PROCEDURE — 83735 ASSAY OF MAGNESIUM: CPT

## 2018-09-13 RX ORDER — OMEPRAZOLE 40 MG/1
40 CAPSULE, DELAYED RELEASE ORAL
Qty: 180 CAPSULE | Refills: 3 | Status: SHIPPED | OUTPATIENT
Start: 2018-09-13 | End: 2019-04-09

## 2018-09-13 NOTE — PROGRESS NOTES
[facility] Note    Reason for Visit:  Gastroesophageal reflux    PCP: Primary Doctor No     Referring MD:     HPI:  This is a 33 y.o. male here for f/u evaluation of EOE and GERD.  Has been taking two tabs omeprazole OTC since running out of RX PPI. GERD  controlled with omeprazole 40 mg daily, but few days weekly will take addition 40 mg for breakthrough pyrosis.   No dysphagia.   Seen by allergist. No allergies  EGD in 7/2018 with EOE  Currently reports dysphagia and pyrosis well controlled with once daily PPI.   GERD hx:Onset-10 years ago dx @[facility]. Took Nexium with good relief at that time.    Abdominal pain - denies  Bowel habits - normal, 1-2 formed BM/day  GI bleeding - denies hematochezia, hematemesis, melena, BRBPR, black/tarry stools, and coffee ground emesis    ROS:  Constitutional: No fevers, no chills, No unintentional weight loss, no fatigue,   ENT: No allergies  CV: No chest pain, no palpitations, no perif. edema, no sob on exertion  Pulm: No cough, No shortness of breath, no wheezes, no sputum  Ophtho: No vision changes  GI: see HPI; also no nausea, no vomiting, no change in appetite  Derm: No rash  Heme: No lymphadenopathy, No bruising  MSK: +arthritis in hands, no muscle pain, no muscle weakness  : No dysuria, No hematuria  Endo: No hot or cold intolerance  Neuro: No syncope, No seizure,     Medical History:  has a past medical history of GERD (gastroesophageal reflux disease).    Surgical History:  has a past surgical history that includes Hand surgery; Ada tooth extraction; Vasectomy; ESOPHAGOGASTRODUODENOSCOPY (EGD) (N/A, 7/20/2018); and ESOPHAGOGASTRODUODENOSCOPY (EGD) (N/A, 11/11/2016).    Family History: family history includes Diabetes in his paternal grandmother..     Social History:  reports that  has never smoked. he has never used smokeless tobacco. He reports that he drinks about 3.6 oz of alcohol per week. He reports that he does not use  "drugs.    No Known Allergies    Current Outpatient Medications   Medication Sig    omeprazole (PRILOSEC) 40 MG capsule Take 1 capsule (40 mg total) by mouth 2 (two) times daily before meals.     No current facility-administered medications for this visit.        Objective Findings:    Vital Signs:  BP (!) 142/98   Pulse (!) 54   Ht 5' 9" (1.753 m)   Wt 102.2 kg (225 lb 5 oz)   BMI 33.27 kg/m²   Body mass index is 33.27 kg/m².    Physical Exam:  General Appearance: Well appearing in no acute distress  Head:   Normocephalic, without obvious abnormality  Eyes:    No scleral icterus, EOMI  ENT: Neck supple, Lips, mucosa, and tongue normal; teeth and gums normal  Lungs: CTA bilaterally in anterior and posterior fields, no wheezes, no crackles.  Heart:  Regular rate and rhythm, S1, S2 normal, no murmurs heard  Abdomen: Soft, non tender, non distended with positive bowel sounds in all four quadrants. No hepatosplenomegaly, ascites, or mass  Extremities: 2+ radial pulses, no clubbing, cyanosis or edema  Skin: No rash to exposed areas  Neurologic: A&Ox4      Labs:  No results found for: WBC, HGB, HCT, PLT, CHOL, TRIG, HDL, LDLDIRECT, ALT, AST, NA, K, CL, CREATININE, BUN, CO2, TSH, PSA, INR, GLUF, HGBA1C, MICROALBUR      Endoscopy:    EGD 7/20/18: esophageal mucosal changes suspicious for EOE (). Dilated with balloon to 20 mm. Small HH. NL stomach. Nl duodenum.   11/2016 EGD Dr. Madonna ZAMORA grade A esophagitis. Moderate Schatzki ring. Dilated up to 18 mm. small HH. bx-EOE  Per pt, EGD 8-10 years ago @ [facility]- esophagitis and dx w/ GERD  Colonoscopy- none  Assessment:  1. Eosinophilic esophagitis    2. Gastroesophageal reflux disease with esophagitis    3. Encounter for monitoring long-term proton pump inhibitor therapy           Recommendations:  1.EOE- no dysphagia. increase PPI to BID for reflux. Will take 2 tabs OTC if insurance will not cover BID RX.  2. BID PPI as above.  3. Pt reminded of slightly increased risk " of CAP, C diff infections and decreased mag, vit D and Vit B- 12 levels with long term PPI use. Yearly Mag, B 12 and Vit D levels recomended. Routine bone mineral densities per PCP recommended. Pt instructed to contact PCP for s/s of lung infection (fever, chills, CP, SOB, and/or productive cough) or GI infection (profuse, watery diarrhea with or without rectal bleeding, severe abd pain,and/or fever). Vit D, mag and B 12 levels today    F/u 1 year or sooner prn.      Order summary:  Orders Placed This Encounter    Magnesium    Vitamin B12    Vitamin D    omeprazole (PRILOSEC) 40 MG capsule         Thank you so much for allowing me to participate in the care of Baltazar Hollins    Brooke Dueñas, APRN,FNP-C

## 2018-09-13 NOTE — PATIENT INSTRUCTIONS
*There is a slightly increased risk of pneumonia infection, C diff infections and decreased magnesium, vitamin D and Vitamin B- 12 levels with long term PPI (ie nexium, prilosec, prevacid, dexilant, protonix) use. Yearly Magnesium, B 12 and Vitamin D levels are recommended through your primary care provider (PCP). Routine bone mineral densities by your  PCP recommended. Contact your PCP for sings and symptoms of lung infection (fever, chills, CP, SOB, and/or productive cough) or Gastro infection (profuse, watery diarrhea with or without rectal bleeding, severe abd pain,and/or fever).

## 2018-09-25 ENCOUNTER — TELEPHONE (OUTPATIENT)
Dept: ENDOSCOPY | Facility: HOSPITAL | Age: 33
End: 2018-09-25

## 2018-09-25 NOTE — TELEPHONE ENCOUNTER
----- Message from Brooke Dueñas NP sent at 9/25/2018  4:25 PM CDT -----  Please inform pt: magnesium and B 12 levels are normal. Vitamin D level is slightly decreased at 26. Please have him take 2000 units of OTC vitamin D daily to supplement.    Thanks,  Irlanda, NP

## 2019-02-25 ENCOUNTER — OFFICE VISIT (OUTPATIENT)
Dept: INTERNAL MEDICINE | Facility: CLINIC | Age: 34
End: 2019-02-25
Payer: COMMERCIAL

## 2019-02-25 ENCOUNTER — IMMUNIZATION (OUTPATIENT)
Dept: PHARMACY | Facility: CLINIC | Age: 34
End: 2019-02-25
Payer: COMMERCIAL

## 2019-02-25 ENCOUNTER — CLINICAL SUPPORT (OUTPATIENT)
Dept: INTERNAL MEDICINE | Facility: CLINIC | Age: 34
End: 2019-02-25
Payer: COMMERCIAL

## 2019-02-25 ENCOUNTER — LAB VISIT (OUTPATIENT)
Dept: LAB | Facility: HOSPITAL | Age: 34
End: 2019-02-25
Attending: INTERNAL MEDICINE
Payer: COMMERCIAL

## 2019-02-25 VITALS
HEIGHT: 69 IN | HEART RATE: 78 BPM | BODY MASS INDEX: 33.62 KG/M2 | WEIGHT: 227 LBS | DIASTOLIC BLOOD PRESSURE: 66 MMHG | SYSTOLIC BLOOD PRESSURE: 128 MMHG | OXYGEN SATURATION: 97 %

## 2019-02-25 DIAGNOSIS — Z11.1 SCREENING FOR TUBERCULOSIS: ICD-10-CM

## 2019-02-25 DIAGNOSIS — Z00.00 HEALTH CARE MAINTENANCE: Primary | ICD-10-CM

## 2019-02-25 DIAGNOSIS — K21.9 GASTROESOPHAGEAL REFLUX DISEASE, ESOPHAGITIS PRESENCE NOT SPECIFIED: ICD-10-CM

## 2019-02-25 PROCEDURE — 99999 PR PBB SHADOW E&M-EST. PATIENT-LVL I: CPT | Mod: PBBFAC,,,

## 2019-02-25 PROCEDURE — 90715 TDAP VACCINE GREATER THAN OR EQUAL TO 7YO IM: ICD-10-PCS | Mod: S$GLB,,, | Performed by: INTERNAL MEDICINE

## 2019-02-25 PROCEDURE — 90471 TDAP VACCINE GREATER THAN OR EQUAL TO 7YO IM: ICD-10-PCS | Mod: S$GLB,,, | Performed by: INTERNAL MEDICINE

## 2019-02-25 PROCEDURE — 86480 TB TEST CELL IMMUN MEASURE: CPT

## 2019-02-25 PROCEDURE — 99385 PREV VISIT NEW AGE 18-39: CPT | Mod: S$GLB,,, | Performed by: INTERNAL MEDICINE

## 2019-02-25 PROCEDURE — 99999 PR PBB SHADOW E&M-EST. PATIENT-LVL III: CPT | Mod: PBBFAC,,, | Performed by: INTERNAL MEDICINE

## 2019-02-25 PROCEDURE — 99385 PR PREVENTIVE VISIT,NEW,18-39: ICD-10-PCS | Mod: S$GLB,,, | Performed by: INTERNAL MEDICINE

## 2019-02-25 PROCEDURE — 99999 PR PBB SHADOW E&M-EST. PATIENT-LVL III: ICD-10-PCS | Mod: PBBFAC,,, | Performed by: INTERNAL MEDICINE

## 2019-02-25 PROCEDURE — 90471 IMMUNIZATION ADMIN: CPT | Mod: S$GLB,,, | Performed by: INTERNAL MEDICINE

## 2019-02-25 PROCEDURE — 99999 PR PBB SHADOW E&M-EST. PATIENT-LVL I: ICD-10-PCS | Mod: PBBFAC,,,

## 2019-02-25 PROCEDURE — 90715 TDAP VACCINE 7 YRS/> IM: CPT | Mod: S$GLB,,, | Performed by: INTERNAL MEDICINE

## 2019-02-25 NOTE — PROGRESS NOTES
Subjective:       Patient ID: Baltazar Hollins is a 33 y.o. male.    Chief Complaint: Establish Care and Annual Exam    HPI   Baltazar Hollins is a 33 y.o. male here to establish care and have yearly preventative healthcare visit.     GERD  PPI bid  Doing well    Tired at end of the day. He does snore. Falls asleep watching tv but not other times. no falling asleep at work or driving. He had balloon sinuplasty which helped with snoring but persists.       The [place] Sleepiness Scale  The [place] Sleepiness Scale is widely used in the field of sleep medicine as a subjective measure of a  patient's sleepiness. The test is a list of eight situations in which you rate your tendency to become  sleepy on a scale of 0, no chance of dozing, to 3, high chance of dozing. When you finish the test, add  up the values of your responses. Your total score is based on a scale of 0 to 24. The scale estimates  whether you are experiencing excessive sleepiness that possibly requires medical attention.    How likely are you to doze off or fall asleep in the following situations? You should rate your chances  of dozing off, not just feeling tired. Even if you have not done some of these things recently try to  determine how they would have affected you. For each situation, decide whether or not you would  have:  ·  No chance of dozing =0  ·  Slight chance of dozing =1  ·  Moderate chance of dozing =2  ·  High chance of dozing =3    Write down the number corresponding to your choice in the right hand column. Total your score below.  Situation                                                                                   Chance of Dozing  Sitting and reading ·  0  Watching TV · 1  Sitting inactive in a public place (e.g., a theater or  a meeting) 0  As a passenger in a car for an hour without a  Break 0  Lying down to rest in the afternoon when  circumstances permit 1  Sitting and talking to someone · 0  Sitting quietly after a  "lunch without alcohol · 0  In a car, while stopped for a few minutes in traffic · 0  Total Score = _______2__________    Review of Systems   Constitutional: Negative for fever.   HENT: Negative.    Eyes: Negative.    Respiratory: Negative for shortness of breath.    Cardiovascular: Negative for chest pain and leg swelling.   Gastrointestinal: Negative for abdominal pain, diarrhea, nausea and vomiting.   Genitourinary: Negative.    Musculoskeletal: Negative for arthralgias.   Skin: Negative for rash.   Psychiatric/Behavioral: Negative.        Objective:   /66 (BP Location: Right arm, Patient Position: Sitting, BP Method: Large (Manual))   Pulse 78   Ht 5' 9" (1.753 m)   Wt 103 kg (227 lb)   SpO2 97%   BMI 33.52 kg/m²      Physical Exam   Constitutional: He is oriented to person, place, and time. He appears well-developed and well-nourished.   HENT:   Head: Normocephalic and atraumatic.   Eyes: Conjunctivae and EOM are normal. Pupils are equal, round, and reactive to light.   Neck: Neck supple. No thyromegaly present.   Cardiovascular: Normal rate, regular rhythm and normal heart sounds.   No murmur heard.  Pulmonary/Chest: Effort normal and breath sounds normal. No respiratory distress. He has no wheezes.   Abdominal: Soft. Bowel sounds are normal. He exhibits no distension. There is no tenderness.   Musculoskeletal: Normal range of motion.   Neurological: He is alert and oriented to person, place, and time.   Skin: Skin is warm and dry. No rash noted.   Psychiatric: He has a normal mood and affect. Judgment and thought content normal.   Vitals reviewed.      Assessment:       1. Health care maintenance    2. Gastroesophageal reflux disease, esophagitis presence not specified    3. Screening for tuberculosis        Plan:       Baltazar was seen today for establish care and annual exam.    Diagnoses and all orders for this visit:    Health care maintenance  -     CBC auto differential; Future  -     " Comprehensive metabolic panel; Future  -     Lipid panel; Future  -     TSH; Future    Gastroesophageal reflux disease, esophagitis presence not specified  Cont PPI BID  Followed by GI    Screening for tuberculosis  -     Quantiferon Gold TB; Future

## 2019-02-27 LAB
M TB IFN-G CD4+ BCKGRND COR BLD-ACNC: 0.01 IU/ML
MITOGEN IGNF BCKGRD COR BLD-ACNC: >10 IU/ML
MITOGEN IGNF BCKGRD COR BLD-ACNC: NEGATIVE [IU]/ML
NIL: 0.03 IU/ML
TB2 - NIL: 0.01 IU/ML

## 2019-04-02 ENCOUNTER — OFFICE VISIT (OUTPATIENT)
Dept: URGENT CARE | Facility: CLINIC | Age: 34
End: 2019-04-02
Payer: COMMERCIAL

## 2019-04-02 VITALS
DIASTOLIC BLOOD PRESSURE: 95 MMHG | HEART RATE: 60 BPM | WEIGHT: 215 LBS | BODY MASS INDEX: 31.84 KG/M2 | OXYGEN SATURATION: 99 % | HEIGHT: 69 IN | SYSTOLIC BLOOD PRESSURE: 134 MMHG | TEMPERATURE: 98 F

## 2019-04-02 DIAGNOSIS — L23.7 POISON IVY DERMATITIS: Primary | ICD-10-CM

## 2019-04-02 PROCEDURE — 96372 THER/PROPH/DIAG INJ SC/IM: CPT | Mod: S$GLB,,, | Performed by: FAMILY MEDICINE

## 2019-04-02 PROCEDURE — 99214 PR OFFICE/OUTPT VISIT, EST, LEVL IV, 30-39 MIN: ICD-10-PCS | Mod: 25,S$GLB,, | Performed by: PHYSICIAN ASSISTANT

## 2019-04-02 PROCEDURE — 3008F PR BODY MASS INDEX (BMI) DOCUMENTED: ICD-10-PCS | Mod: CPTII,S$GLB,, | Performed by: PHYSICIAN ASSISTANT

## 2019-04-02 PROCEDURE — 99214 OFFICE O/P EST MOD 30 MIN: CPT | Mod: 25,S$GLB,, | Performed by: PHYSICIAN ASSISTANT

## 2019-04-02 PROCEDURE — 3008F BODY MASS INDEX DOCD: CPT | Mod: CPTII,S$GLB,, | Performed by: PHYSICIAN ASSISTANT

## 2019-04-02 PROCEDURE — 96372 PR INJECTION,THERAP/PROPH/DIAG2ST, IM OR SUBCUT: ICD-10-PCS | Mod: S$GLB,,, | Performed by: FAMILY MEDICINE

## 2019-04-02 RX ORDER — BETAMETHASONE SODIUM PHOSPHATE AND BETAMETHASONE ACETATE 3; 3 MG/ML; MG/ML
12 INJECTION, SUSPENSION INTRA-ARTICULAR; INTRALESIONAL; INTRAMUSCULAR; SOFT TISSUE
Status: COMPLETED | OUTPATIENT
Start: 2019-04-02 | End: 2019-04-02

## 2019-04-02 RX ORDER — PREDNISONE 20 MG/1
TABLET ORAL
Qty: 18 TABLET | Refills: 0 | Status: SHIPPED | OUTPATIENT
Start: 2019-04-02

## 2019-04-02 RX ORDER — HYDROXYZINE PAMOATE 25 MG/1
25 CAPSULE ORAL 4 TIMES DAILY
Qty: 30 CAPSULE | Refills: 1 | Status: SHIPPED | OUTPATIENT
Start: 2019-04-02

## 2019-04-02 RX ADMIN — BETAMETHASONE SODIUM PHOSPHATE AND BETAMETHASONE ACETATE 12 MG: 3; 3 INJECTION, SUSPENSION INTRA-ARTICULAR; INTRALESIONAL; INTRAMUSCULAR; SOFT TISSUE at 08:04

## 2019-04-02 NOTE — PATIENT INSTRUCTIONS
Start Prednisone tomorrow    Allergic Reaction /Contact Dermatitis  If your condition worsens or fails to improve we recommend that you receive another evaluation at the ER immediately or contact your PCP to discuss your concerns or return here. You must understand that you've received an urgent care treatment only and that you may be released before all your medical problems are known or treated. You the patient will arrange for followup care as instructed.      Zyrtec, Claritin or Allegra should be used daily for the next 5-7 days to prevent or suppress the itching. You can take Benedryl 25 mg at bedtime as well.      Please take over the counter Pepcid or Zantac as directed for the next 24-72 hours as needed.     If you had a steroid shot/prednisone pills here in the clinic today and given a prescription of a steroid such as prednisone or a Medrol Dose Pack, please don't start the oral prescription until  till tomorrow.      If you develop additional symptoms such as tongue swelling or trouble breathing go immediately to the ER.      FOLLOW UP WITH YOUR DERMATOLOGIST AS WE DISCUSSED IF SYMPTOMS DON'T IMPROVE OVER THE NEXT 4-5 DAYS.     AVOID TRIGGERS!        If not allergic,take tylenol (acetominophen) for fever control, chills, or body aches every 4 hours. Do not exceed 4000 mg/ day.If not allergic, take Motrin (Ibuprofen) every 4 hours for fever, chills, pain or inflammation. Do not exceed 2400 mg/day. You can alternate taking tylenol and motrin.  If you were prescribed a narcotic medication, do not drive or operate heavy equipment or machinery while taking these medications.  You must understand that you've received an Urgent Care treatment only and that you may be released before all your medical problems are known or treated. You, the patient, will arrange for follow up care as instructed.  Follow up with your PCP or specialty clinic as directed in the next 1-2 weeks if not improved or as needed.  You can  call [phone] to schedule an appointment with the appropriate provider.  If your condition worsens we recommend that you receive another evaluation at the emergency room immediately or contact your primary medical clinics after hours call service to discuss your concerns.  Please return here or go to the Emergency Department for any concerns or worsening of condition.

## 2019-04-02 NOTE — PROGRESS NOTES
"Subjective:       Patient ID: Baltazar Hollins is a 33 y.o. male.    Vitals:  height is 5' 9" (1.753 m) and weight is 97.5 kg (215 lb). His oral temperature is 98.3 °F (36.8 °C). His blood pressure is 134/95 (abnormal) and his pulse is 60. His oxygen saturation is 99%.     Chief Complaint: Melody Rosales was working to replace a fence and cutting branches out of his way Saturday. Hours later he noticed a rash on his arms that has now spread.    Rash   This is a new problem. The current episode started in the past 7 days. The problem has been rapidly worsening since onset. The rash is diffuse. The rash is characterized by itchiness, redness and blistering. He was exposed to plant contact. Pertinent negatives include no cough, fever or sore throat. Past treatments include antihistamine and anti-itch cream. The treatment provided mild relief.       Constitution: Negative for chills and fever.   HENT: Negative for facial swelling and sore throat.    Neck: Negative for painful lymph nodes.   Eyes: Negative for eye itching and eyelid swelling.   Respiratory: Negative for cough.    Musculoskeletal: Negative for joint pain and joint swelling.   Skin: Positive for rash. Negative for color change, pale, wound, abrasion, laceration, lesion, skin thickening/induration, puncture wound, erythema, abscess, avulsion and hives.   Allergic/Immunologic: Negative for environmental allergies, immunocompromised state and hives.   Hematologic/Lymphatic: Negative for swollen lymph nodes.       Objective:      Physical Exam   Constitutional: He is oriented to person, place, and time. He appears well-developed and well-nourished.   HENT:   Head: Normocephalic and atraumatic. Head is without abrasion, without contusion and without laceration.   Right Ear: External ear normal.   Left Ear: External ear normal.   Nose: Nose normal.   Mouth/Throat: Oropharynx is clear and moist.   Eyes: Pupils are equal, round, and reactive to light. " Conjunctivae, EOM and lids are normal.   Neck: Trachea normal, full passive range of motion without pain and phonation normal. Neck supple.   Cardiovascular: Normal rate, regular rhythm and normal heart sounds.   Pulmonary/Chest: Effort normal and breath sounds normal. No stridor. No respiratory distress.   Musculoskeletal: Normal range of motion.   Neurological: He is alert and oriented to person, place, and time.   Skin: Skin is warm, dry and intact. Capillary refill takes less than 2 seconds. Rash noted. No abrasion, no bruising, no burn, no ecchymosis, no laceration and no lesion noted. Rash is maculopapular, vesicular and urticarial. No erythema.        Psychiatric: He has a normal mood and affect. His speech is normal and behavior is normal. Judgment and thought content normal. Cognition and memory are normal.   Nursing note and vitals reviewed.      Assessment:       1. Poison ivy dermatitis        Plan:         Poison ivy dermatitis  -     betamethasone acetate-betamethasone sodium phosphate injection 12 mg  -     predniSONE (DELTASONE) 20 MG tablet; Take 2 pills x 5 days, 1 pill x 5 days, 1/2 pill until empty.  Dispense: 18 tablet; Refill: 0  -     hydrOXYzine pamoate (VISTARIL) 25 MG Cap; Take 1 capsule (25 mg total) by mouth 4 (four) times daily.  Dispense: 30 capsule; Refill: 1      Patient Instructions   Start Prednisone tomorrow    Allergic Reaction /Contact Dermatitis  If your condition worsens or fails to improve we recommend that you receive another evaluation at the ER immediately or contact your PCP to discuss your concerns or return here. You must understand that you've received an urgent care treatment only and that you may be released before all your medical problems are known or treated. You the patient will arrange for followup care as instructed.      Zyrtec, Claritin or Allegra should be used daily for the next 5-7 days to prevent or suppress the itching. You can take Benedryl 25 mg at  bedtime as well.      Please take over the counter Pepcid or Zantac as directed for the next 24-72 hours as needed.     If you had a steroid shot/prednisone pills here in the clinic today and given a prescription of a steroid such as prednisone or a Medrol Dose Pack, please don't start the oral prescription until  till tomorrow.      If you develop additional symptoms such as tongue swelling or trouble breathing go immediately to the ER.      FOLLOW UP WITH YOUR DERMATOLOGIST AS WE DISCUSSED IF SYMPTOMS DON'T IMPROVE OVER THE NEXT 4-5 DAYS.     AVOID TRIGGERS!        If not allergic,take tylenol (acetominophen) for fever control, chills, or body aches every 4 hours. Do not exceed 4000 mg/ day.If not allergic, take Motrin (Ibuprofen) every 4 hours for fever, chills, pain or inflammation. Do not exceed 2400 mg/day. You can alternate taking tylenol and motrin.  If you were prescribed a narcotic medication, do not drive or operate heavy equipment or machinery while taking these medications.  You must understand that you've received an Urgent Care treatment only and that you may be released before all your medical problems are known or treated. You, the patient, will arrange for follow up care as instructed.  Follow up with your PCP or specialty clinic as directed in the next 1-2 weeks if not improved or as needed.  You can call [phone] to schedule an appointment with the appropriate provider.  If your condition worsens we recommend that you receive another evaluation at the emergency room immediately or contact your primary medical clinics after hours call service to discuss your concerns.  Please return here or go to the Emergency Department for any concerns or worsening of condition.

## 2019-04-05 ENCOUNTER — TELEPHONE (OUTPATIENT)
Dept: URGENT CARE | Facility: CLINIC | Age: 34
End: 2019-04-05

## 2019-04-09 ENCOUNTER — OFFICE VISIT (OUTPATIENT)
Dept: URGENT CARE | Facility: CLINIC | Age: 34
End: 2019-04-09
Payer: COMMERCIAL

## 2019-04-09 VITALS
RESPIRATION RATE: 18 BRPM | TEMPERATURE: 103 F | OXYGEN SATURATION: 100 % | WEIGHT: 215 LBS | SYSTOLIC BLOOD PRESSURE: 134 MMHG | HEART RATE: 112 BPM | BODY MASS INDEX: 31.84 KG/M2 | DIASTOLIC BLOOD PRESSURE: 85 MMHG | HEIGHT: 69 IN

## 2019-04-09 DIAGNOSIS — R50.9 FEVER, UNSPECIFIED FEVER CAUSE: ICD-10-CM

## 2019-04-09 DIAGNOSIS — R68.89 FLU-LIKE SYMPTOMS: Primary | ICD-10-CM

## 2019-04-09 LAB
CTP QC/QA: YES
FLUAV AG NPH QL: NEGATIVE
FLUBV AG NPH QL: NEGATIVE

## 2019-04-09 PROCEDURE — 99214 PR OFFICE/OUTPT VISIT, EST, LEVL IV, 30-39 MIN: ICD-10-PCS | Mod: S$GLB,,, | Performed by: PHYSICIAN ASSISTANT

## 2019-04-09 PROCEDURE — 99214 OFFICE O/P EST MOD 30 MIN: CPT | Mod: S$GLB,,, | Performed by: PHYSICIAN ASSISTANT

## 2019-04-09 PROCEDURE — 3008F PR BODY MASS INDEX (BMI) DOCUMENTED: ICD-10-PCS | Mod: CPTII,S$GLB,, | Performed by: PHYSICIAN ASSISTANT

## 2019-04-09 PROCEDURE — 87804 POCT INFLUENZA A/B: ICD-10-PCS | Mod: 59,QW,S$GLB, | Performed by: PHYSICIAN ASSISTANT

## 2019-04-09 PROCEDURE — 87804 INFLUENZA ASSAY W/OPTIC: CPT | Mod: QW,S$GLB,, | Performed by: PHYSICIAN ASSISTANT

## 2019-04-09 PROCEDURE — 3008F BODY MASS INDEX DOCD: CPT | Mod: CPTII,S$GLB,, | Performed by: PHYSICIAN ASSISTANT

## 2019-04-10 NOTE — PATIENT INSTRUCTIONS
INFLUENZA (Adult)          Influenza, also called 'the flu,' is a viral illness that affects the air passages of the lungs. It differs from the common cold. It is highly contagious. It may be spread through the air by coughing and sneezing or by direct contact (touching the sick person and then touching your own eyes, nose or mouth).    Illness starts 1-3 days after exposure and lasts for 1-2 weeks. Antibiotics are usually not needed unless a complication appears (ear or sinus infection or pneumonia).    Symptoms may be mild or severe and can include extreme tiredness (wanting to stay in bed all day), chills, fevers, muscle aching, soreness with eye movement, headache and a dry, hacking cough.    HOME CARE:  Avoid exposure to cigarette smoke (yours or others).  Tylenol or ibuprofen (Advil) will help fever, muscle aching and headache. To avoid risk of liver injury, aspirin should not be used in children and teenagers under 18 with this illness.  Nausea and loss of appetite are common. A light diet is recommended. Avoid dehydration by drinking 6-8 glasses of fluids per day (water, sport drinks like Gatorade, soft drinks without caffeine, juices, tea, soup, etc.). Extra fluids will also help loosen secretions in the nose and lungs.  Over-the-counter cold medicines will not shorten the duration of the illness but may be helpful for the following symptoms: cough (Robitussin DM); sore throat (Chloraseptic lozenges or spray); nasal and sinus congestion (Actifed or Sudafed). [NOTE: Do not use decongestants if you have high blood pressure.]  Stay home until your fever has been gone for at least 24 hours (without the use of fever-reducing medications such as ibuprofen).    FOLLOW UP with your doctor or as directed by our staff if you are not improving over the next week.    NOTE: If you are age 65 or older, or if you have chronic asthma or COPD, we recommend a pneumococcal vaccination every five years and a yearly influenza  vaccination (flu-shot) every autumn. Ask your doctor about this.    GET PROMPT MEDICAL ATTENTION if any of the following occur:  Cough with lots of colored sputum (mucus) or blood in your sputum  Chest pain, shortness of breath, wheezing or difficulty breathing  Severe headache, face, neck or ear pain  New rash  Fever over 101.5º F (38.6 C) for more than three days  Confusion, behavior change or seizure  Severe weakness or dizziness    Symptomatic treatment:    Alternate Tylenol and Ibuprofen every 3 hrs  salt water gargles to soothe throat  Honey/lemon water to soothe throat  Cold-eeze helps to reduce the duration of URI symptoms  Elderberry to reduce duration of URI symptoms  Cepachol helps to numb the discomfort in throat  Nasal saline spray reduces inflammation and dryness  Warm face compresses/hot showers as often as you can to open sinuses   Vicks vapor rub at night  Flonase OTC or Nasacort OTC  Simple foods like chicken noodle soup help hydrate  Delsym helps with coughing at night  Zyrtec/Claritin during the day and Benadryl at night may help if allergy component   Zantac will help if there is reflux from the post nasal drip  Rest as much as you can  Your symptoms will likely last 5-7 days, maybe longer depending on how it affects your body.  You are contagious 5-7, so minimize contact with others to reduce the spread to others. Dehydration is preventable but is one of the main reasons why you will feel so badly. Drink pedialyte, gatorade or propel. Stay hydrated.  Antibiotics are not needed unless a complication(such as Otitis Media, Bacterial sinus infection or pneumonia). Taking antibiotics for Flu/Cold is not supported by evidence-based medicine and can expose you to unnecessary side effects of the medication, such as anaphylaxis.   If you experience any:  Chest pain, shortness of breath, wheezing or difficulty breathing  Severe headache, face, neck or ear pain  New rash  Fever over 101.5º F (38.6 C) for  more than three days  Confusion, behavior change or seizure  Severe weakness or dizziness  Go to ER       If not allergic,take tylenol (acetominophen) for fever control, chills, or body aches every 4 hours. Do not exceed 4000 mg/ day.If not allergic, take Motrin (Ibuprofen) every 4 hours for fever, chills, pain or inflammation. Do not exceed 2400 mg/day. You can alternate taking tylenol and motrin.  If you were prescribed a narcotic medication, do not drive or operate heavy equipment or machinery while taking these medications.  You must understand that you've received an Urgent Care treatment only and that you may be released before all your medical problems are known or treated. You, the patient, will arrange for follow up care as instructed.  Follow up with your PCP or specialty clinic as directed in the next 1-2 weeks if not improved or as needed.  You can call [phone] to schedule an appointment with the appropriate provider.  If your condition worsens we recommend that you receive another evaluation at the emergency room immediately or contact your primary medical clinics after hours call service to discuss your concerns.  Please return here or go to the Emergency Department for any concerns or worsening of condition.

## 2019-04-10 NOTE — PROGRESS NOTES
"Subjective:       Patient ID: Baltazar Hollins is a 33 y.o. male.    Vitals:  height is 5' 9" (1.753 m) and weight is 97.5 kg (215 lb). His oral temperature is 103.3 °F (39.6 °C) (abnormal). His blood pressure is 134/85 and his pulse is 112 (abnormal). His respiration is 18 and oxygen saturation is 100%.     Chief Complaint: Fever    Pt started feeling achy around noon today and fever 103 before coming here. Pt took advil about 10 minutes ago. Pt states his son was running fever last two days also    Fever    This is a new problem. The current episode started today. The problem occurs constantly. The problem has been unchanged. The maximum temperature noted was 103 to 103.9 F. The temperature was taken using an oral thermometer. Pertinent negatives include no chest pain, congestion, coughing, diarrhea, headaches, nausea, rash, sore throat, urinary pain or vomiting. Treatments tried: advil. The treatment provided no relief.       Constitution: Positive for fever. Negative for chills and fatigue.   HENT: Negative for congestion and sore throat.    Neck: Negative for painful lymph nodes.   Cardiovascular: Negative for chest pain and leg swelling.   Eyes: Negative for double vision and blurred vision.   Respiratory: Negative for cough and shortness of breath.    Gastrointestinal: Negative for nausea, vomiting and diarrhea.   Genitourinary: Negative for dysuria, frequency and urgency.   Musculoskeletal: Positive for muscle ache. Negative for joint pain, joint swelling and muscle cramps.   Skin: Negative for color change, pale and rash.   Allergic/Immunologic: Negative for seasonal allergies.   Neurological: Negative for dizziness, history of vertigo, light-headedness, passing out and headaches.   Hematologic/Lymphatic: Negative for swollen lymph nodes, easy bruising/bleeding and history of blood clots. Does not bruise/bleed easily.   Psychiatric/Behavioral: Negative for nervous/anxious, sleep disturbance and " depression. The patient is not nervous/anxious.        Objective:      Physical Exam   Constitutional: He is oriented to person, place, and time. He appears well-developed and well-nourished. He is cooperative.  Non-toxic appearance. He appears ill. No distress.   HENT:   Head: Normocephalic and atraumatic.   Right Ear: Hearing, tympanic membrane, external ear and ear canal normal.   Left Ear: Hearing, tympanic membrane, external ear and ear canal normal.   Nose: Nose normal. No mucosal edema, rhinorrhea or nasal deformity. No epistaxis. Right sinus exhibits no maxillary sinus tenderness and no frontal sinus tenderness. Left sinus exhibits no maxillary sinus tenderness and no frontal sinus tenderness.   Mouth/Throat: Uvula is midline, oropharynx is clear and moist and mucous membranes are normal. No trismus in the jaw. Normal dentition. No uvula swelling. No posterior oropharyngeal erythema.   Eyes: Conjunctivae and lids are normal. No scleral icterus.   Sclera clear bilat   Neck: Trachea normal, full passive range of motion without pain and phonation normal. Neck supple.   Cardiovascular: Normal rate, regular rhythm, normal heart sounds, intact distal pulses and normal pulses.   Pulmonary/Chest: Effort normal and breath sounds normal. No respiratory distress.   Abdominal: Soft. Normal appearance and bowel sounds are normal. He exhibits no distension. There is no tenderness.   Musculoskeletal: Normal range of motion. He exhibits no edema or deformity.   Neurological: He is alert and oriented to person, place, and time. He exhibits normal muscle tone. Coordination normal.   Skin: Skin is warm, dry and intact. He is not diaphoretic. No pallor.   Psychiatric: He has a normal mood and affect. His speech is normal and behavior is normal. Judgment and thought content normal. Cognition and memory are normal.   Nursing note and vitals reviewed.      Assessment:       1. Flu-like symptoms    2. Fever, unspecified fever cause         Plan:         Flu-like symptoms  -     baloxavir marboxil (XOFLUZA) 40 mg Tab; Take 80 mg by mouth once. for 1 dose  Dispense: 2 tablet; Refill: 0    Fever, unspecified fever cause  -     POCT Influenza A/B      Results for orders placed or performed in visit on 04/09/19   POCT Influenza A/B   Result Value Ref Range    Rapid Influenza A Ag Negative Negative    Rapid Influenza B Ag Negative Negative     Acceptable Yes         Patient Instructions   INFLUENZA (Adult)          Influenza, also called 'the flu,' is a viral illness that affects the air passages of the lungs. It differs from the common cold. It is highly contagious. It may be spread through the air by coughing and sneezing or by direct contact (touching the sick person and then touching your own eyes, nose or mouth).    Illness starts 1-3 days after exposure and lasts for 1-2 weeks. Antibiotics are usually not needed unless a complication appears (ear or sinus infection or pneumonia).    Symptoms may be mild or severe and can include extreme tiredness (wanting to stay in bed all day), chills, fevers, muscle aching, soreness with eye movement, headache and a dry, hacking cough.    HOME CARE:  Avoid exposure to cigarette smoke (yours or others).  Tylenol or ibuprofen (Advil) will help fever, muscle aching and headache. To avoid risk of liver injury, aspirin should not be used in children and teenagers under 18 with this illness.  Nausea and loss of appetite are common. A light diet is recommended. Avoid dehydration by drinking 6-8 glasses of fluids per day (water, sport drinks like Gatorade, soft drinks without caffeine, juices, tea, soup, etc.). Extra fluids will also help loosen secretions in the nose and lungs.  Over-the-counter cold medicines will not shorten the duration of the illness but may be helpful for the following symptoms: cough (Robitussin DM); sore throat (Chloraseptic lozenges or spray); nasal and sinus congestion  (Actifed or Sudafed). [NOTE: Do not use decongestants if you have high blood pressure.]  Stay home until your fever has been gone for at least 24 hours (without the use of fever-reducing medications such as ibuprofen).    FOLLOW UP with your doctor or as directed by our staff if you are not improving over the next week.    NOTE: If you are age 65 or older, or if you have chronic asthma or COPD, we recommend a pneumococcal vaccination every five years and a yearly influenza vaccination (flu-shot) every autumn. Ask your doctor about this.    GET PROMPT MEDICAL ATTENTION if any of the following occur:  Cough with lots of colored sputum (mucus) or blood in your sputum  Chest pain, shortness of breath, wheezing or difficulty breathing  Severe headache, face, neck or ear pain  New rash  Fever over 101.5º F (38.6 C) for more than three days  Confusion, behavior change or seizure  Severe weakness or dizziness    Symptomatic treatment:    Alternate Tylenol and Ibuprofen every 3 hrs  salt water gargles to soothe throat  Honey/lemon water to soothe throat  Cold-eeze helps to reduce the duration of URI symptoms  Elderberry to reduce duration of URI symptoms  Cepachol helps to numb the discomfort in throat  Nasal saline spray reduces inflammation and dryness  Warm face compresses/hot showers as often as you can to open sinuses   Vicks vapor rub at night  Flonase OTC or Nasacort OTC  Simple foods like chicken noodle soup help hydrate  Delsym helps with coughing at night  Zyrtec/Claritin during the day and Benadryl at night may help if allergy component   Zantac will help if there is reflux from the post nasal drip  Rest as much as you can  Your symptoms will likely last 5-7 days, maybe longer depending on how it affects your body.  You are contagious 5-7, so minimize contact with others to reduce the spread to others. Dehydration is preventable but is one of the main reasons why you will feel so badly. Drink pedialyte, gatorade  or propel. Stay hydrated.  Antibiotics are not needed unless a complication(such as Otitis Media, Bacterial sinus infection or pneumonia). Taking antibiotics for Flu/Cold is not supported by evidence-based medicine and can expose you to unnecessary side effects of the medication, such as anaphylaxis.   If you experience any:  Chest pain, shortness of breath, wheezing or difficulty breathing  Severe headache, face, neck or ear pain  New rash  Fever over 101.5º F (38.6 C) for more than three days  Confusion, behavior change or seizure  Severe weakness or dizziness  Go to ER       If not allergic,take tylenol (acetominophen) for fever control, chills, or body aches every 4 hours. Do not exceed 4000 mg/ day.If not allergic, take Motrin (Ibuprofen) every 4 hours for fever, chills, pain or inflammation. Do not exceed 2400 mg/day. You can alternate taking tylenol and motrin.  If you were prescribed a narcotic medication, do not drive or operate heavy equipment or machinery while taking these medications.  You must understand that you've received an Urgent Care treatment only and that you may be released before all your medical problems are known or treated. You, the patient, will arrange for follow up care as instructed.  Follow up with your PCP or specialty clinic as directed in the next 1-2 weeks if not improved or as needed.  You can call [phone] to schedule an appointment with the appropriate provider.  If your condition worsens we recommend that you receive another evaluation at the emergency room immediately or contact your primary medical clinics after hours call service to discuss your concerns.  Please return here or go to the Emergency Department for any concerns or worsening of condition.

## 2019-04-23 ENCOUNTER — PATIENT MESSAGE (OUTPATIENT)
Dept: INTERNAL MEDICINE | Facility: CLINIC | Age: 34
End: 2019-04-23

## 2019-04-25 ENCOUNTER — PATIENT MESSAGE (OUTPATIENT)
Dept: INTERNAL MEDICINE | Facility: CLINIC | Age: 34
End: 2019-04-25

## 2019-04-25 DIAGNOSIS — R68.82 DECREASED SEX DRIVE: Primary | ICD-10-CM

## 2019-04-25 DIAGNOSIS — R53.83 FATIGUE, UNSPECIFIED TYPE: ICD-10-CM

## 2019-04-26 ENCOUNTER — LAB VISIT (OUTPATIENT)
Dept: LAB | Facility: HOSPITAL | Age: 34
End: 2019-04-26
Attending: INTERNAL MEDICINE
Payer: COMMERCIAL

## 2019-04-26 DIAGNOSIS — R53.83 FATIGUE, UNSPECIFIED TYPE: ICD-10-CM

## 2019-04-26 DIAGNOSIS — R68.82 DECREASED SEX DRIVE: ICD-10-CM

## 2019-04-26 LAB — TESTOST SERPL-MCNC: 373 NG/DL (ref 304–1227)

## 2019-04-26 PROCEDURE — 84402 ASSAY OF FREE TESTOSTERONE: CPT

## 2019-04-26 PROCEDURE — 84403 ASSAY OF TOTAL TESTOSTERONE: CPT

## 2019-04-29 LAB — TESTOST FREE SERPL-MCNC: 11.6 PG/ML (ref 5.1–41.5)

## 2020-10-05 ENCOUNTER — PATIENT MESSAGE (OUTPATIENT)
Dept: ADMINISTRATIVE | Facility: HOSPITAL | Age: 35
End: 2020-10-05

## 2021-01-04 ENCOUNTER — PATIENT MESSAGE (OUTPATIENT)
Dept: ADMINISTRATIVE | Facility: HOSPITAL | Age: 36
End: 2021-01-04

## 2021-04-05 ENCOUNTER — PATIENT MESSAGE (OUTPATIENT)
Dept: ADMINISTRATIVE | Facility: HOSPITAL | Age: 36
End: 2021-04-05

## 2021-07-06 ENCOUNTER — PATIENT MESSAGE (OUTPATIENT)
Dept: ADMINISTRATIVE | Facility: HOSPITAL | Age: 36
End: 2021-07-06

## 2021-10-04 ENCOUNTER — PATIENT MESSAGE (OUTPATIENT)
Dept: ADMINISTRATIVE | Facility: HOSPITAL | Age: 36
End: 2021-10-04

## 2022-11-29 ENCOUNTER — OFFICE VISIT (OUTPATIENT)
Dept: DERMATOLOGY | Facility: CLINIC | Age: 37
End: 2022-11-29
Payer: COMMERCIAL

## 2022-11-29 DIAGNOSIS — Z85.828 HISTORY OF NONMELANOMA SKIN CANCER: ICD-10-CM

## 2022-11-29 DIAGNOSIS — D48.5 NEOPLASM OF UNCERTAIN BEHAVIOR OF SKIN: Primary | ICD-10-CM

## 2022-11-29 PROCEDURE — 88305 TISSUE EXAM BY PATHOLOGIST: ICD-10-PCS | Mod: 26,,, | Performed by: PATHOLOGY

## 2022-11-29 PROCEDURE — 99202 PR OFFICE/OUTPT VISIT, NEW, LEVL II, 15-29 MIN: ICD-10-PCS | Mod: 25,S$GLB,, | Performed by: DERMATOLOGY

## 2022-11-29 PROCEDURE — 88305 TISSUE EXAM BY PATHOLOGIST: CPT | Mod: 26,,, | Performed by: PATHOLOGY

## 2022-11-29 PROCEDURE — 99202 OFFICE O/P NEW SF 15 MIN: CPT | Mod: 25,S$GLB,, | Performed by: DERMATOLOGY

## 2022-11-29 PROCEDURE — 11102 TANGNTL BX SKIN SINGLE LES: CPT | Mod: S$GLB,,, | Performed by: DERMATOLOGY

## 2022-11-29 PROCEDURE — 1159F PR MEDICATION LIST DOCUMENTED IN MEDICAL RECORD: ICD-10-PCS | Mod: CPTII,S$GLB,, | Performed by: DERMATOLOGY

## 2022-11-29 PROCEDURE — 1159F MED LIST DOCD IN RCRD: CPT | Mod: CPTII,S$GLB,, | Performed by: DERMATOLOGY

## 2022-11-29 PROCEDURE — 99999 PR PBB SHADOW E&M-EST. PATIENT-LVL III: CPT | Mod: PBBFAC,,, | Performed by: DERMATOLOGY

## 2022-11-29 PROCEDURE — 88305 TISSUE EXAM BY PATHOLOGIST: CPT | Performed by: PATHOLOGY

## 2022-11-29 PROCEDURE — 11102 PR TANGENTIAL BIOPSY, SKIN, SINGLE LESION: ICD-10-PCS | Mod: S$GLB,,, | Performed by: DERMATOLOGY

## 2022-11-29 PROCEDURE — 99999 PR PBB SHADOW E&M-EST. PATIENT-LVL III: ICD-10-PCS | Mod: PBBFAC,,, | Performed by: DERMATOLOGY

## 2022-11-29 NOTE — PROGRESS NOTES
Subjective:       Patient ID:  Baltazar Hollins is a 37 y.o. male who presents for   Chief Complaint   Patient presents with    Mole     Right upper arm      Mole - Initial  Affected locations: right arm  Duration: 3 months  Signs / symptoms: itching, scabs and bleeding  Severity: mild  Timing: constant  Aggravated by: nothing  Relieving factors/Treatments tried: nothing  Improvement on treatment: no relief    Review of Systems   Constitutional:  Negative for fever, chills and fatigue.   Skin:  Positive for daily sunscreen use. Negative for recent sunburn and wears hat.   Hematologic/Lymphatic: Does not bruise/bleed easily.      Objective:    Physical Exam   Constitutional: He appears well-developed and well-nourished. No distress.   Neurological: He is alert and oriented to person, place, and time. He is not disoriented.   Psychiatric: He has a normal mood and affect.   Skin:   Areas Examined (abnormalities noted in diagram):   Scalp / Hair Palpated and Inspected  Head / Face Inspection Performed            Diagram Legend     Erythematous scaling macule/papule c/w actinic keratosis       Vascular papule c/w angioma      Pigmented verrucoid papule/plaque c/w seborrheic keratosis      Yellow umbilicated papule c/w sebaceous hyperplasia      Irregularly shaped tan macule c/w lentigo     1-2 mm smooth white papules consistent with Milia      Movable subcutaneous cyst with punctum c/w epidermal inclusion cyst      Subcutaneous movable cyst c/w pilar cyst      Firm pink to brown papule c/w dermatofibroma      Pedunculated fleshy papule(s) c/w skin tag(s)      Evenly pigmented macule c/w junctional nevus     Mildly variegated pigmented, slightly irregular-bordered macule c/w mildly atypical nevus      Flesh colored to evenly pigmented papule c/w intradermal nevus       Pink pearly papule/plaque c/w basal cell carcinoma      Erythematous hyperkeratotic cursted plaque c/w SCC      Surgical scar with no sign of skin  cancer recurrence      Open and closed comedones      Inflammatory papules and pustules      Verrucoid papule consistent consistent with wart     Erythematous eczematous patches and plaques     Dystrophic onycholytic nail with subungual debris c/w onychomycosis     Umbilicated papule    Erythematous-base heme-crusted tan verrucoid plaque consistent with inflamed seborrheic keratosis     Erythematous Silvery Scaling Plaque c/w Psoriasis     See annotation            Assessment / Plan:      Pathology Orders:       Normal Orders This Visit    Specimen to Pathology, Dermatology     Questions:    Procedure Type: Dermatology and skin neoplasms    Number of Specimens: 1    ------------------------: -------------------------    Spec 1 Procedure: Biopsy    Spec 1 Clinical Impression: r/o pyogenic granuloma vs bcc    Spec 1 Source: right upper arm    Release to patient: Immediate          Neoplasm of uncertain behavior of skin  -     Specimen to Pathology, Dermatology  Shave biopsy procedure note:    Shave biopsy performed after verbal consent including risk of infection, scar, recurrence, need for additional treatment of site. Area prepped with alcohol, anesthetized with approximately 1.0cc of 1% lidocaine with epinephrine. Lesional tissue shaved with razor blade. Hemostasis achieved with application of aluminum chloride followed by hyfrecation. No complications. Dressing applied. Wound care explained.    Pt hx of 15 skin cancers            Follow up for for TBSE.

## 2022-11-29 NOTE — PATIENT INSTRUCTIONS
Shave Biopsy Wound Care    Your doctor has performed a shave biopsy today.  A band aid and vaseline ointment has been placed over the site.  This should remain in place for NO LONGER THAN 48 hours.  It is fine to remove the bandaid after 24 hours, if the area is no longer bleeding. It is recommended that you keep the area dry (do not wet)) for the first 24 hours.  After 24 hours, wash the area with warm soap and water and apply Vaseline jelly.  Many patients prefer to use Neosporin or Bacitracin ointment.  This is acceptable; however, know that you can develop an allergy to this medication even if you have used it safely for years.  It is important to keep the area moist.  Letting it dry out and get air slows healing time, and will worsen the scar.        If you notice increasing redness, tenderness, pain, or yellow drainage at the biopsy site, please notify your doctor.  These are signs of an infection.    If your biopsy site is bleeding, apply firm pressure for 15 minutes straight.  Repeat for another 15 minutes, if it is still bleeding.   If the surgical site continues to bleed, then please contact your doctor.      For [facility] users:   You will receive your biopsy results in [facility] as soon as they are available. Please be assured that your physician/provider will review your results and will then determine what further treatment, evaluation, or planning is required. You should be contacted by your physician's/provider's office within 5 business days of receiving your results; If not, please reach out to directly. This is one more way [facility] is putting you [email or web address]     [facility] [place] [address]/ [phone] FAX/ [email or web address]

## 2022-12-09 LAB
FINAL PATHOLOGIC DIAGNOSIS: NORMAL
GROSS: NORMAL
Lab: NORMAL
MICROSCOPIC EXAM: NORMAL

## 2023-01-18 ENCOUNTER — PATIENT MESSAGE (OUTPATIENT)
Dept: DERMATOLOGY | Facility: CLINIC | Age: 38
End: 2023-01-18
Payer: COMMERCIAL